# Patient Record
Sex: MALE | Race: WHITE | NOT HISPANIC OR LATINO | Employment: FULL TIME | URBAN - METROPOLITAN AREA
[De-identification: names, ages, dates, MRNs, and addresses within clinical notes are randomized per-mention and may not be internally consistent; named-entity substitution may affect disease eponyms.]

---

## 2020-07-13 ENCOUNTER — HOSPITAL ENCOUNTER (OUTPATIENT)
Dept: OTHER | Facility: HOSPITAL | Age: 50
Discharge: HOME | End: 2020-07-13
Attending: INTERNAL MEDICINE

## 2020-07-13 LAB
ALBUMIN SERPL-MCNC: 4.1 G/DL (ref 3.5–5)
ALBUMIN/GLOB SERPL: 1.3 {RATIO} (ref 1.4–2.6)
ALP SERPL-CCNC: 93 U/L (ref 53–128)
ALT SERPL-CCNC: 35 U/L (ref 10–40)
ANION GAP SERPL CALC-SCNC: 15 MMOL/L (ref 8–19)
AST SERPL-CCNC: 27 U/L (ref 15–50)
BASOPHILS # BLD AUTO: 0.12 10*3/UL (ref 0–0.2)
BASOPHILS NFR BLD AUTO: 0.9 % (ref 0–3)
BILIRUB SERPL-MCNC: 0.69 MG/DL (ref 0.2–1.3)
BUN SERPL-MCNC: 15 MG/DL (ref 5–25)
BUN/CREAT SERPL: 12 {RATIO} (ref 6–20)
CALCIUM SERPL-MCNC: 9.3 MG/DL (ref 8.7–10.4)
CHLORIDE SERPL-SCNC: 103 MMOL/L (ref 99–111)
CONV ABS IMM GRAN: 0.06 10*3/UL (ref 0–0.2)
CONV CO2: 24 MMOL/L (ref 22–32)
CONV IMMATURE GRAN: 0.5 % (ref 0–1.8)
CONV TOTAL PROTEIN: 7.3 G/DL (ref 6.3–8.2)
CREAT UR-MCNC: 1.26 MG/DL (ref 0.7–1.2)
DEPRECATED RDW RBC AUTO: 47.2 FL (ref 35.1–43.9)
EOSINOPHIL # BLD AUTO: 0.22 10*3/UL (ref 0–0.7)
EOSINOPHIL # BLD AUTO: 1.7 % (ref 0–7)
ERYTHROCYTE [DISTWIDTH] IN BLOOD BY AUTOMATED COUNT: 13.5 % (ref 11.6–14.4)
GFR SERPLBLD BASED ON 1.73 SQ M-ARVRAT: >60 ML/MIN/{1.73_M2}
GLOBULIN UR ELPH-MCNC: 3.2 G/DL (ref 2–3.5)
GLUCOSE SERPL-MCNC: 83 MG/DL (ref 70–99)
HCT VFR BLD AUTO: 47.9 % (ref 42–52)
HGB BLD-MCNC: 15.5 G/DL (ref 14–18)
LYMPHOCYTES # BLD AUTO: 3.74 10*3/UL (ref 1–5)
LYMPHOCYTES NFR BLD AUTO: 29.4 % (ref 20–45)
MCH RBC QN AUTO: 30.7 PG (ref 27–31)
MCHC RBC AUTO-ENTMCNC: 32.4 G/DL (ref 33–37)
MCV RBC AUTO: 94.9 FL (ref 80–96)
MONOCYTES # BLD AUTO: 1.22 10*3/UL (ref 0.2–1.2)
MONOCYTES NFR BLD AUTO: 9.6 % (ref 3–10)
NEUTROPHILS # BLD AUTO: 7.35 10*3/UL (ref 2–8)
NEUTROPHILS NFR BLD AUTO: 57.9 % (ref 30–85)
NRBC CBCN: 0 % (ref 0–0.7)
OSMOLALITY SERPL CALC.SUM OF ELEC: 284 MOSM/KG (ref 273–304)
PLATELET # BLD AUTO: 402 10*3/UL (ref 130–400)
PMV BLD AUTO: 10.7 FL (ref 9.4–12.4)
POTASSIUM SERPL-SCNC: 4.5 MMOL/L (ref 3.5–5.3)
RBC # BLD AUTO: 5.05 10*6/UL (ref 4.7–6.1)
SODIUM SERPL-SCNC: 137 MMOL/L (ref 135–147)
WBC # BLD AUTO: 12.71 10*3/UL (ref 4.8–10.8)

## 2020-07-16 ENCOUNTER — OFFICE VISIT CONVERTED (OUTPATIENT)
Dept: CARDIOLOGY | Facility: CLINIC | Age: 50
End: 2020-07-16
Attending: INTERNAL MEDICINE

## 2020-07-16 ENCOUNTER — CONVERSION ENCOUNTER (OUTPATIENT)
Dept: CARDIOLOGY | Facility: CLINIC | Age: 50
End: 2020-07-16

## 2020-07-17 ENCOUNTER — HOSPITAL ENCOUNTER (OUTPATIENT)
Dept: PREADMISSION TESTING | Facility: HOSPITAL | Age: 50
Discharge: HOME | End: 2020-07-17
Attending: INTERNAL MEDICINE

## 2020-07-19 LAB — SARS-COV-2 RNA SPEC QL NAA+PROBE: NOT DETECTED

## 2020-07-21 ENCOUNTER — HOSPITAL ENCOUNTER (OUTPATIENT)
Dept: INFUSION THERAPY | Facility: HOSPITAL | Age: 50
Setting detail: HOSPITAL OUTPATIENT SURGERY
Discharge: HOME | End: 2020-07-22
Attending: INTERNAL MEDICINE

## 2020-07-21 LAB
ALBUMIN SERPL-MCNC: 3.9 G/DL (ref 3.5–5)
ALBUMIN/GLOB SERPL: 1.1 {RATIO} (ref 1.4–2.6)
ALP SERPL-CCNC: 83 U/L (ref 53–128)
ALT SERPL-CCNC: 37 U/L (ref 10–40)
ANION GAP SERPL CALC-SCNC: 19 MMOL/L (ref 8–19)
AST SERPL-CCNC: 26 U/L (ref 15–50)
BASOPHILS # BLD AUTO: 0.09 10*3/UL (ref 0–0.2)
BASOPHILS NFR BLD AUTO: 0.7 % (ref 0–3)
BILIRUB SERPL-MCNC: 0.46 MG/DL (ref 0.2–1.3)
BNP SERPL-MCNC: 129 PG/ML (ref 0–900)
BUN SERPL-MCNC: 16 MG/DL (ref 5–25)
BUN/CREAT SERPL: 14 {RATIO} (ref 6–20)
CALCIUM SERPL-MCNC: 9.5 MG/DL (ref 8.7–10.4)
CHLORIDE SERPL-SCNC: 105 MMOL/L (ref 99–111)
CHOLEST SERPL-MCNC: 78 MG/DL (ref 107–200)
CHOLEST/HDLC SERPL: 3.7 {RATIO} (ref 3–6)
CONV ABS IMM GRAN: 0.06 10*3/UL (ref 0–0.2)
CONV CO2: 21 MMOL/L (ref 22–32)
CONV IMMATURE GRAN: 0.5 % (ref 0–1.8)
CONV TOTAL PROTEIN: 7.5 G/DL (ref 6.3–8.2)
CREAT UR-MCNC: 1.11 MG/DL (ref 0.7–1.2)
DEPRECATED RDW RBC AUTO: 45 FL (ref 35.1–43.9)
EOSINOPHIL # BLD AUTO: 0.3 10*3/UL (ref 0–0.7)
EOSINOPHIL # BLD AUTO: 2.4 % (ref 0–7)
ERYTHROCYTE [DISTWIDTH] IN BLOOD BY AUTOMATED COUNT: 13.1 % (ref 11.6–14.4)
GFR SERPLBLD BASED ON 1.73 SQ M-ARVRAT: >60 ML/MIN/{1.73_M2}
GLOBULIN UR ELPH-MCNC: 3.6 G/DL (ref 2–3.5)
GLUCOSE SERPL-MCNC: 108 MG/DL (ref 70–99)
HCT VFR BLD AUTO: 47.9 % (ref 42–52)
HDLC SERPL-MCNC: 21 MG/DL (ref 40–60)
HGB BLD-MCNC: 16 G/DL (ref 14–18)
INR PPP: 0.92 (ref 2–3)
LDLC SERPL CALC-MCNC: 37 MG/DL (ref 70–100)
LYMPHOCYTES # BLD AUTO: 2.29 10*3/UL (ref 1–5)
LYMPHOCYTES NFR BLD AUTO: 18.4 % (ref 20–45)
MCH RBC QN AUTO: 31.4 PG (ref 27–31)
MCHC RBC AUTO-ENTMCNC: 33.4 G/DL (ref 33–37)
MCV RBC AUTO: 93.9 FL (ref 80–96)
MONOCYTES # BLD AUTO: 1.01 10*3/UL (ref 0.2–1.2)
MONOCYTES NFR BLD AUTO: 8.1 % (ref 3–10)
NEUTROPHILS # BLD AUTO: 8.68 10*3/UL (ref 2–8)
NEUTROPHILS NFR BLD AUTO: 69.9 % (ref 30–85)
NRBC CBCN: 0 % (ref 0–0.7)
OSMOLALITY SERPL CALC.SUM OF ELEC: 292 MOSM/KG (ref 273–304)
PLATELET # BLD AUTO: 484 10*3/UL (ref 130–400)
PMV BLD AUTO: 10.8 FL (ref 9.4–12.4)
POTASSIUM SERPL-SCNC: 4.5 MMOL/L (ref 3.5–5.3)
PROTHROMBIN TIME: 10.1 S (ref 9.4–12)
RBC # BLD AUTO: 5.1 10*6/UL (ref 4.7–6.1)
SODIUM SERPL-SCNC: 140 MMOL/L (ref 135–147)
TRIGL SERPL-MCNC: 98 MG/DL (ref 40–150)
VLDLC SERPL-MCNC: 20 MG/DL (ref 5–37)
WBC # BLD AUTO: 12.43 10*3/UL (ref 4.8–10.8)

## 2020-08-05 ENCOUNTER — CONVERSION ENCOUNTER (OUTPATIENT)
Dept: CARDIOLOGY | Facility: CLINIC | Age: 50
End: 2020-08-05

## 2020-08-05 ENCOUNTER — OFFICE VISIT CONVERTED (OUTPATIENT)
Dept: CARDIOLOGY | Facility: CLINIC | Age: 50
End: 2020-08-05
Attending: INTERNAL MEDICINE

## 2020-08-19 ENCOUNTER — HOSPITAL ENCOUNTER (OUTPATIENT)
Dept: PREADMISSION TESTING | Facility: HOSPITAL | Age: 50
Discharge: HOME | End: 2020-08-19
Attending: INTERNAL MEDICINE

## 2020-08-20 LAB — SARS-COV-2 RNA SPEC QL NAA+PROBE: NOT DETECTED

## 2020-08-24 ENCOUNTER — HOSPITAL ENCOUNTER (OUTPATIENT)
Dept: INFUSION THERAPY | Facility: HOSPITAL | Age: 50
Setting detail: HOSPITAL OUTPATIENT SURGERY
Discharge: HOME | End: 2020-08-24
Attending: INTERNAL MEDICINE

## 2020-08-24 LAB
ANION GAP SERPL CALC-SCNC: 14 MMOL/L (ref 8–19)
BASOPHILS # BLD AUTO: 0.1 10*3/UL (ref 0–0.2)
BASOPHILS NFR BLD AUTO: 0.7 % (ref 0–3)
BUN SERPL-MCNC: 15 MG/DL (ref 5–25)
BUN/CREAT SERPL: 13 {RATIO} (ref 6–20)
CALCIUM SERPL-MCNC: 9.8 MG/DL (ref 8.7–10.4)
CHLORIDE SERPL-SCNC: 102 MMOL/L (ref 99–111)
CONV ABS IMM GRAN: 0.08 10*3/UL (ref 0–0.2)
CONV ACT HIGH RANGE CLOTTIME: NORMAL
CONV CO2: 22 MMOL/L (ref 22–32)
CONV IMMATURE GRAN: 0.6 % (ref 0–1.8)
CREAT UR-MCNC: 1.13 MG/DL (ref 0.7–1.2)
DEPRECATED RDW RBC AUTO: 44.8 FL (ref 35.1–43.9)
EOSINOPHIL # BLD AUTO: 0.21 10*3/UL (ref 0–0.7)
EOSINOPHIL # BLD AUTO: 1.5 % (ref 0–7)
ERYTHROCYTE [DISTWIDTH] IN BLOOD BY AUTOMATED COUNT: 13.1 % (ref 11.6–14.4)
GFR SERPLBLD BASED ON 1.73 SQ M-ARVRAT: >60 ML/MIN/{1.73_M2}
GLUCOSE SERPL-MCNC: 111 MG/DL (ref 70–99)
HCT VFR BLD AUTO: 43.4 % (ref 42–52)
HGB BLD-MCNC: 14.3 G/DL (ref 14–18)
LYMPHOCYTES # BLD AUTO: 2.23 10*3/UL (ref 1–5)
LYMPHOCYTES NFR BLD AUTO: 15.9 % (ref 20–45)
MCH RBC QN AUTO: 30.8 PG (ref 27–31)
MCHC RBC AUTO-ENTMCNC: 32.9 G/DL (ref 33–37)
MCV RBC AUTO: 93.3 FL (ref 80–96)
MONOCYTES # BLD AUTO: 1.16 10*3/UL (ref 0.2–1.2)
MONOCYTES NFR BLD AUTO: 8.3 % (ref 3–10)
NEUTROPHILS # BLD AUTO: 10.27 10*3/UL (ref 2–8)
NEUTROPHILS NFR BLD AUTO: 73 % (ref 30–85)
NRBC CBCN: 0 % (ref 0–0.7)
OSMOLALITY SERPL CALC.SUM OF ELEC: 280 MOSM/KG (ref 273–304)
PLATELET # BLD AUTO: 384 10*3/UL (ref 130–400)
PMV BLD AUTO: 11 FL (ref 9.4–12.4)
POTASSIUM SERPL-SCNC: 4.3 MMOL/L (ref 3.5–5.3)
RBC # BLD AUTO: 4.65 10*6/UL (ref 4.7–6.1)
SODIUM SERPL-SCNC: 134 MMOL/L (ref 135–147)
WBC # BLD AUTO: 14.05 10*3/UL (ref 4.8–10.8)

## 2020-09-01 LAB — CONV ACT HIGH RANGE CLOTTIME: NORMAL

## 2020-10-14 ENCOUNTER — OFFICE VISIT CONVERTED (OUTPATIENT)
Dept: CARDIOLOGY | Facility: CLINIC | Age: 50
End: 2020-10-14
Attending: NURSE PRACTITIONER

## 2021-05-13 NOTE — PROGRESS NOTES
Progress Note      Patient Name: Eric Curtis   Patient ID: [id number]   Sex: Male   YOB: 1970    Primary Care Provider: Stephan Morales Jr. MD   Referring Provider: Stephan Morales Jr. MD    Visit Date: October 14, 2020    Provider: ANGEL Ndiaye   Location: [facility]   Location Address: [address] [place] [address], [address]   [place], [place]  [id number]   Location Phone: [phone]          Chief Complaint  · Evaluate coronary artery disease.      History Of Present Illness  REFERRING PROVIDER: Stephan Morales Jr., MD   Eric Curtis is a 50 year old male who denies any chest pain or pressure. No palpitations, shortness of breath, swelling, dizziness, syncope, PND, or orthopnea. Cardiac-wise, he is feeling very well. He continues to not smoke. He admits he does not get any regular exercise, but he states he is very active in his job. He underwent a peripheral vascular surgery and had a PTA stent placement to the right common iliac artery on 08/24/2020, and he feels like his pain with walking has greatly improved.   PAST MEDICAL HISTORY: Hypertension; Peripheral vascular disease with stent to the right common iliac artery; ST-elevation myocardial infarction in July 2020.   PSYCHOSOCIAL HISTORY: Previously smoked, but quit. Moderate alcohol consumption.   CURRENT MEDICATIONS: Nitroglycerin 0.4 mg sublingual p.r.n.; Brilinta 90 mg b.i.d.; pantoprazole 40 mg daily; atorvastatin 80 mg daily; carvedilol 6.25 mg 1 in the morning and 2 at night; spironolactone 25 mg daily; losartan 50 mg daily; aspirin 81 mg daily; fluticasone nasal spray; Claritin daily.       Review of Systems  · Cardiovascular  o Denies  o : palpitations (fast, fluttering, or skipping beats), swelling (feet, ankles, hands), shortness of breath while walking or lying flat, chest pain or angina pectoris   · Respiratory  o Denies  o : chronic or frequent cough      Vitals  Date Time BP Position Site L\R Cuff Size HR RR TEMP  (F) WT  HT  BMI kg/m2 BSA m2 O2 Sat FR L/min FiO2 HC       10/14/2020 02:25 /100 Sitting    56 - R   200lbs 0oz 6'   27.12 2.15       10/14/2020 02:25 /98 Sitting    62 - R                   Physical Examination  · Constitutional  o Appearance  o : Awake, alert, in no acute distress.  · Eyes  o Conjunctivae  o : Conjunctivae normal.  · Ears, Nose, Mouth and Throat  o Oral Cavity  o :   § Oral Mucosa  § : Normal.  · Neck  o Jugular Veins  o : No JVD. Good carotid upstroke. No bruits noted.  · Respiratory  o Respiratory  o : Increased AP diameter with diminished breath sounds. Prolonged expiration. No rales or rhonchi. No dullness on percussion.   · Cardiovascular  o Heart  o : PMI not well felt. S1, S2 normal. No S3. No S4.   o Peripheral Vascular System  o :   § Extremities  § : Good femoral and pedal pulses. No pedal edema.  · Gastrointestinal  o Abdominal Examination  o : Soft. No tenderness or masses felt. No hepatosplenomegaly. Abdominal aorta is not palpable.  · Labs  o Labs  o : Lipids not done recently. Labs from August were reviewed.          Assessment     1.  Coronary artery disease with previous MI and stent/PCI without angina.  2.  Hypertension, uncertain control.  3.  Hyperlipidemia, unknown control.  4.  Nicotine dependence.  5.  Peripheral vascular disease with previous iliac stent.       Plan     1.  He will do a blood pressure log, and we will adjust hypertensive medications if needed.    2.  Check a lipid panel in the next few weeks, and adjust meds accordingly.  3.  Strongly encouraged him to never smoke again.  4.  Follow up in 6 months with labs or earlier if needed.          ANGEL Garza  JF:vm             Electronically Signed by: Humera Enrique-, Other -Author on October 15, 2020 11:43:51 AM  Electronically Co-signed by: ANGEL Ndiaye -Reviewer on October 15, 2020 12:12:43 PM

## 2021-05-13 NOTE — PROGRESS NOTES
Progress Note      Patient Name: Eric Curtis   Patient ID: [id number]   Sex: Male   YOB: 1970        Visit Date: August 5, 2020    Provider: Taye Crowley MD   Location: [facility]   Location Address: [address] [place] [address], [address]   [place]  [id number]   Location Phone: [phone]          Chief Complaint  · Chest pain       History Of Present Illness  REFERRING PROVIDER: Stephan Morales Jr., MD   Eric Curtis is a 50-year-old gentleman who is status post anterior ST elevation myocardial infarction and subsequently staged PCI of a diffusely diseased right coronary artery who had one bout of chest pain when he went on a fishing trip. It lasted 15 to 20 minutes. It was mild to moderate in severity after severe exertion. He had forgotten to take his nitroglycerin with him. He now travels everywhere with it. He has not had any recurrent pain since then. He denies palpitations, dizziness, or syncope.   PAST MEDICAL HISTORY: Hypertension; peripheral vascular disease; ST elevation myocardial infarction in July 2020.   FAMILY HISTORY: Positive for diabetes and hypertension. Negative for heart disease.   PSYCHOSOCIAL HISTORY: No history of mood change or depression. He drinks a moderate amount of alcohol. He previously smoked but quit.   CURRENT MEDICATIONS: include Losartan 50 mg daily; Pantoprazole 40 mg daily; Spironolactone 25 mg daily; nitroglycerin 0.4 mg p.r.n.; Carvedilol 6.25 mg b.i.d.; aspirin 81 mg daily; Atorvastatin 80 mg daily; Brilinta 90 mg b.i.d.; Fluticasone 50 mcg p.r.n. The dosage and frequency of the medications were reviewed with the patient.       Review of Systems  · Cardiovascular  o Admits  o : swelling (feet, ankles, hands), chest pain or angina pectoris   o Denies  o : palpitations (fast, fluttering, or skipping beats), shortness of breath while walking or lying flat  · Respiratory  o Denies  o : chronic or frequent cough, asthma or  wheezing      Vitals  Date Time BP Position Site L\R Cuff Size HR RR TEMP (F) WT  HT  BMI kg/m2 BSA m2 O2 Sat HC       08/05/2020 12:22 /87 Sitting    56 - R   198lbs 0oz 6'   26.85 2.14           Physical Examination  · Constitutional  o Appearance  o : Awake, alert, in no acute distress.  · Eyes  o Conjunctivae  o : Conjunctivae normal.  · Ears, Nose, Mouth and Throat  o Oral Cavity  o :   § Oral Mucosa  § : Normal.  · Neck  o Inspection/Palpation/Auscultation  o : No lymphadenopathy. No JVD. No bruit. Good carotid upstroke.  · Respiratory  o Respiratory  o : Increased AP diameter with diminished breath sounds. Prolonged expiration. No rales or rhonchi. No dullness on percussion.  · Cardiovascular  o Heart  o : PMI is not well felt. S1, S2 are normal. No S3. No S4.  o Peripheral Vascular System  o :   § Extremities  § : Good femoral and pedal pulses. No pedal edema.  · Gastrointestinal  o Abdominal Examination  o : Soft. No masses or tenderness felt. No hepatosplenomegaly. Abdominal aorta is not palpable.     The patient underwent a treadmill stress test which was negative for ischemia.           Assessment     1.  Coronary artery disease with one episode of angina, status post stent/PCI to the LAD and right coronary        artery.   2.  Hypertension, controlled.   3.  Hyperlipidemia.   4.  Nicotine dependence, cigarettes.   5.  Peripheral vascular disease with right severe iliac stenosis.       Plan     1.  Strongly urged him to completely stop smoking.  He thinks is going to do it in the next week or so.   2.  Increase Carvedilol to 12.5 mg b.i.d.  3.  Proceed with stenting to the right iliac artery by Dr. Austin in two to three weeks.   4.  Enroll in cardiac rehabilitation phase 2.  5.  I have asked him to do a two-week blood pressure log starting Sunday.  6.  Follow up with us in two to three months with blood work at that time.       Taye Crowley MD, [facility]  PM/pap    This note was transcribed by Alida  Bhakti.  pap/pm  The above service was transcribed by Alida Ya, and I attest to the accuracy of the note.  PM             Electronically Signed by: Adalgisa Ya-, Other -Author on August 6, 2020 12:34:41 PM  Electronically Co-signed by: Taye Crowley MD -Reviewer on August 6, 2020 02:05:54 PM

## 2021-05-13 NOTE — PROGRESS NOTES
Progress Note      Patient Name: Eric Curtis   Patient ID: [id number]   Sex: Male   YOB: 1970    Primary Care Provider: Stephan Morales Jr. MD   Referring Provider: Taye Crowley MD    Visit Date: July 16, 2020    Provider: Taye Crowley MD   Location: [facility]   Location Address: [address] [place] [address], [facility]   [place], [place]  [id number]   Location Phone: [phone]          Chief Complaint  · Fatigue   · Shortness of breath   · Tired feeling      History Of Present Illness  REFERRING PROVIDER: Stephan Morales Jr., MD   Eric Curtis is a 50-year-old gentleman who had an anterior ST elevation myocardial infarction, and since discharge he gets extremely fatigued and tired easily. He is short of breath with mild to moderate exertion. He denies dizziness or syncope.   PAST MEDICAL HISTORY: Positive for hypertension; peripheral vascular disease; ST elevation myocardial infarction July 2020.   FAMILY HISTORY: Positive for diabetes and hypertension. Negative for heart disease.   PSYCHOSOCIAL HISTORY: No history of mood changes or depression. He drinks a moderate amount of alcohol and smokes 1 pack of cigarettes per day.   CURRENT MEDICATIONS: include aspirin 81 mg daily; Atorvastatin 80 mg at bedtime; Carvedilol 6.25 mg b.i.d.; Losartan 50 mg at bedtime; NTG 0.4 mg p.r.n.; Spironolactone 25 mg daily; Ticagrelor 90 mg b.i.d.; Loratadine 10 mg daily; Fluticasone 2 sprays each nare qd; Protonix 40 mg daily. The dosage and frequency of the medications were reviewed with the patient.       Review of Systems  · Cardiovascular  o Denies  o : palpitations (fast, fluttering, or skipping beats), swelling (feet, ankles, hands), shortness of breath while walking or lying flat, chest pain or angina pectoris   · Respiratory  o Denies  o : chronic or frequent cough, asthma or wheezing      Vitals  Date Time BP Position Site L\R Cuff Size HR RR TEMP (F) WT  HT  BMI kg/m2 BSA m2 O2 Sat HC        07/16/2020 11:06 /86 Sitting    72 - R   198lbs 0oz 6'   26.85 2.14           Physical Examination  · Constitutional  o Appearance  o : Awake, alert, in no acute distress.  · Eyes  o Conjunctivae  o : Conjunctivae normal.  · Ears, Nose, Mouth and Throat  o Oral Cavity  o :   § Oral Mucosa  § : Normal.  · Neck  o Inspection/Palpation/Auscultation  o : No lymphadenopathy. No JVD. No bruit. Good carotid upstroke.  · Respiratory  o Respiratory  o : Increased AP diameter with diminished breath sounds. Prolonged expiration. No rales or rhonchi. No dullness on percussion.   · Cardiovascular  o Heart  o : PMI is not well felt. S1, S2 are normal. No S3. S4+.  o Peripheral Vascular System  o :   § Extremities  § : Good femoral and pedal pulses. No pedal edema.  · Gastrointestinal  o Abdominal Examination  o : Soft. No masses or tenderness felt. No hepatosplenomegaly. Abdominal aorta is not palpable.     EKG was performed in the office today.  Indication:       recent myocardial infarction, fatigue, shortness of breath, possible angina-equivalent.  Results:          sinus rhythm, non-specific T wave abnormalities anterolateral leads, minimal ST elevation                        lateral leads.  Comparison:   No EKG available for comparison.    Cardiac catheterization was reviewed.  He has residual disease that is high grade in his right coronary artery.  There is an 80-90% proximal and mid stenosis and a distal 60-70% stenosis in the right coronary artery.    CBC is normal, except for slightly elevated white count of 12.7.  Hemoglobin 15.5.  Platelets are normal.  Chemistry panel:  BUN 15, creatinine 1.26.           Assessment     1.  Angina pectoris.  2.  Coronary artery disease with recent ST elevation anterior wall myocardial infarction, s/p PCI.  3.  Hypertension.  4.  Nicotine dependence, cigarettes.  5.  Hyperlipidemia.       Plan     1.  PCI to the right coronary artery on Tuesday.  2.  Instructions on how to use  his NTG was given to the patient.  3.  Strongly urged him to stop smoking and offered aids, but he declined.  He said he is going to do it on his       own.  He does not look very motivated.  4.  We will change his Spironolactone to every other day and hold it on Tuesday.  5.  Indication and risk of the procedure were explained to him and his wife.    Taye Crowley M.D., [facility]  pmm/dmd           This note was transcribed by Isadora Begum.  dmd/rachael  The above service was transcribed by Isadora Begum, and I attest to the accuracy of the note.  PMM               Electronically Signed by: Isadora Begum-, -Author on July 20, 2020 08:13:38 AM  Electronically Co-signed by: Taye Crowley MD -Reviewer on July 29, 2020 04:13:54 PM

## 2021-05-14 VITALS
HEIGHT: 72 IN | DIASTOLIC BLOOD PRESSURE: 100 MMHG | BODY MASS INDEX: 27.09 KG/M2 | HEART RATE: 56 BPM | WEIGHT: 200 LBS | SYSTOLIC BLOOD PRESSURE: 146 MMHG

## 2021-05-15 VITALS
BODY MASS INDEX: 26.82 KG/M2 | WEIGHT: 198 LBS | DIASTOLIC BLOOD PRESSURE: 87 MMHG | HEART RATE: 56 BPM | HEIGHT: 72 IN | SYSTOLIC BLOOD PRESSURE: 128 MMHG

## 2021-05-15 VITALS
WEIGHT: 198 LBS | DIASTOLIC BLOOD PRESSURE: 86 MMHG | SYSTOLIC BLOOD PRESSURE: 124 MMHG | BODY MASS INDEX: 26.82 KG/M2 | HEIGHT: 72 IN | HEART RATE: 72 BPM

## 2021-08-24 RX ORDER — TICAGRELOR 90 MG/1
TABLET ORAL
Qty: 60 TABLET | OUTPATIENT
Start: 2021-08-24

## 2021-11-04 ENCOUNTER — TELEPHONE (OUTPATIENT)
Dept: CARDIOLOGY | Facility: CLINIC | Age: 51
End: 2021-11-04

## 2021-11-16 ENCOUNTER — TELEPHONE (OUTPATIENT)
Dept: CARDIOLOGY | Facility: CLINIC | Age: 51
End: 2021-11-16

## 2021-11-16 NOTE — TELEPHONE ENCOUNTER
Patient stated that he has been without the medication for 1 month because it is too expensive. He is still taking the Aspirin. Please advise.

## 2021-11-17 DIAGNOSIS — I25.10 CORONARY ARTERY DISEASE INVOLVING NATIVE HEART WITHOUT ANGINA PECTORIS, UNSPECIFIED VESSEL OR LESION TYPE: Primary | ICD-10-CM

## 2021-11-17 RX ORDER — CLOPIDOGREL BISULFATE 75 MG/1
75 TABLET ORAL DAILY
Qty: 90 TABLET | Refills: 3 | Status: SHIPPED | OUTPATIENT
Start: 2021-11-17 | End: 2021-12-01 | Stop reason: SDUPTHER

## 2021-11-17 NOTE — TELEPHONE ENCOUNTER
I am assuming that the medication talks about being expensive is Brilinta.  We will change it to clopidogrel 75 mg a day.  Continue aspirin 81 mg a day

## 2021-11-23 RX ORDER — ATORVASTATIN CALCIUM 80 MG/1
TABLET, FILM COATED ORAL
Qty: 10 TABLET | Refills: 0 | Status: SHIPPED | OUTPATIENT
Start: 2021-11-23 | End: 2021-12-01 | Stop reason: SDUPTHER

## 2021-11-24 RX ORDER — LOSARTAN POTASSIUM 50 MG/1
TABLET ORAL
Qty: 7 TABLET | Refills: 0 | Status: SHIPPED | OUTPATIENT
Start: 2021-11-24 | End: 2021-12-01 | Stop reason: SDUPTHER

## 2021-12-01 ENCOUNTER — OFFICE VISIT (OUTPATIENT)
Dept: CARDIOLOGY | Facility: CLINIC | Age: 51
End: 2021-12-01

## 2021-12-01 VITALS
WEIGHT: 211 LBS | HEART RATE: 64 BPM | DIASTOLIC BLOOD PRESSURE: 98 MMHG | HEIGHT: 72 IN | SYSTOLIC BLOOD PRESSURE: 140 MMHG | BODY MASS INDEX: 28.58 KG/M2

## 2021-12-01 DIAGNOSIS — I10 ESSENTIAL HYPERTENSION: ICD-10-CM

## 2021-12-01 DIAGNOSIS — E78.5 HYPERLIPIDEMIA LDL GOAL <70: ICD-10-CM

## 2021-12-01 DIAGNOSIS — I73.9 PVD (PERIPHERAL VASCULAR DISEASE) WITH CLAUDICATION (HCC): Primary | ICD-10-CM

## 2021-12-01 DIAGNOSIS — I25.10 CORONARY ARTERY DISEASE INVOLVING NATIVE HEART WITHOUT ANGINA PECTORIS, UNSPECIFIED VESSEL OR LESION TYPE: ICD-10-CM

## 2021-12-01 PROCEDURE — 99214 OFFICE O/P EST MOD 30 MIN: CPT | Performed by: INTERNAL MEDICINE

## 2021-12-01 RX ORDER — SPIRONOLACTONE 25 MG/1
25 TABLET ORAL DAILY
Qty: 90 TABLET | Refills: 3 | Status: SHIPPED | OUTPATIENT
Start: 2021-12-01 | End: 2022-12-28 | Stop reason: SDUPTHER

## 2021-12-01 RX ORDER — CARVEDILOL 6.25 MG/1
TABLET ORAL
Qty: 270 TABLET | Refills: 3 | Status: SHIPPED | OUTPATIENT
Start: 2021-12-01 | End: 2022-12-28 | Stop reason: SDUPTHER

## 2021-12-01 RX ORDER — LOSARTAN POTASSIUM 50 MG/1
50 TABLET ORAL 2 TIMES DAILY
Qty: 180 TABLET | Refills: 3 | Status: SHIPPED | OUTPATIENT
Start: 2021-12-01 | End: 2022-12-28 | Stop reason: SDUPTHER

## 2021-12-01 RX ORDER — CLOPIDOGREL BISULFATE 75 MG/1
75 TABLET ORAL DAILY
Qty: 90 TABLET | Refills: 3 | Status: SHIPPED | OUTPATIENT
Start: 2021-12-01 | End: 2022-12-28 | Stop reason: SDUPTHER

## 2021-12-01 RX ORDER — CARVEDILOL 6.25 MG/1
6.25 TABLET ORAL 2 TIMES DAILY WITH MEALS
COMMUNITY
End: 2021-12-01 | Stop reason: SDUPTHER

## 2021-12-01 RX ORDER — SPIRONOLACTONE 25 MG/1
25 TABLET ORAL DAILY
COMMUNITY
End: 2021-12-01 | Stop reason: SDUPTHER

## 2021-12-01 RX ORDER — NITROGLYCERIN 0.4 MG/1
0.4 TABLET SUBLINGUAL
COMMUNITY
End: 2022-12-28 | Stop reason: SDUPTHER

## 2021-12-01 RX ORDER — ASPIRIN 81 MG/1
81 TABLET ORAL DAILY
COMMUNITY

## 2021-12-01 RX ORDER — ATORVASTATIN CALCIUM 80 MG/1
80 TABLET, FILM COATED ORAL NIGHTLY
Qty: 90 TABLET | Refills: 3 | Status: SHIPPED | OUTPATIENT
Start: 2021-12-01 | End: 2022-12-28 | Stop reason: SDUPTHER

## 2021-12-01 NOTE — ASSESSMENT & PLAN NOTE
He has had recurrence of his right leg claudication.  His right femoral pulses weak although his pedal pulses good.  I am going to go ahead and do AELE  with stress and then decide if he is going to need further intervention

## 2021-12-01 NOTE — ASSESSMENT & PLAN NOTE
Hypertension is not as well-controlled as I would like to be.  He frequently has systolic pressures between 125 and 140.  I am going to increase his losartan to 50 twice daily and continue the spironolactone 25 mg once a day.  He is going to get his blood work done including CBC chemistry panel lipid panel thyroid panel in 5 days

## 2021-12-01 NOTE — PROGRESS NOTES
Office Visit    Chief Complaint  Hypertension    Subjective            Eric Ever presents to [facility]  Mr. Curtis is a 51 years old gentleman with coronary disease previous anterior wall marker infarction, ST elevation, subsequent PCI to LAD, right coronary artery as well as stent to his right iliac artery  (Dr. Austin did the peripheral intervention), who is done reasonably well.  From a cardiac standpoint he has not had any chest pain shortness of breath palpitations dizziness syncope.  However, over the last several months is a recurrence of his right leg pain with activities.  He is not able to do his full range of exercise but he still tries to walk for 5 days a week.  He has quit smoking his last cigarette was in June 2021      Past Medical History:   Diagnosis Date   • Hypertension    • Myocardial infarction (HCC)        No Known Allergies     Past Surgical History:   Procedure Laterality Date   • CARDIAC CATHETERIZATION     • CORONARY STENT PLACEMENT          Social History     Tobacco Use   • Smoking status: Former Smoker   • Smokeless tobacco: Never Used   Vaping Use   • Vaping Use: Never used   Substance Use Topics   • Alcohol use: Yes   • Drug use: Never       History reviewed. No pertinent family history.     Prior to Admission medications    Medication Sig Start Date End Date Taking? Authorizing Provider   aspirin 81 MG EC tablet Take 81 mg by mouth Daily.   Yes ProviderSaw MD   atorvastatin (LIPITOR) 80 MG tablet TAKE 1 TABLET(80 MG) BY MOUTH EVERY DAY 11/23/21  Yes Taye Crowley MD   carvedilol (COREG) 6.25 MG tablet Take 6.25 mg by mouth 2 (Two) Times a Day With Meals. Take 1 tablet by mouth every morning and 2 tablets by mouth every evening   Yes ProviderSaw MD   clopidogrel (PLAVIX) 75 MG tablet Take 1 tablet by mouth Daily. 11/17/21  Yes Sujatha Lawrence APRN   losartan (COZAAR) 50 MG tablet TAKE 1 TABLET(50 MG) BY MOUTH EVERY DAY 11/24/21   "Yes Taye Crowley MD   nitroglycerin (NITROSTAT) 0.4 MG SL tablet Place 0.4 mg under the tongue Every 5 (Five) Minutes As Needed for Chest Pain. Take no more than 3 doses in 15 minutes.   Yes ProviderSaw MD   spironolactone (ALDACTONE) 25 MG tablet Take 25 mg by mouth Daily.   Yes ProviderSaw MD        Review of Systems   Constitutional: Negative for fatigue.   Respiratory: Negative for cough and shortness of breath.    Cardiovascular: Negative for chest pain, palpitations and leg swelling.   Neurological: Negative for dizziness.        Objective     /98 (BP Location: Left arm, Patient Position: Standing, Cuff Size: Adult)   Pulse 64   Ht 182.9 cm (72\")   Wt 95.7 kg (211 lb)   BMI 28.62 kg/m²       Physical Exam  Constitutional:       General: He is awake.      Appearance: Normal appearance.   Neck:      Thyroid: No thyromegaly.      Vascular: No carotid bruit or JVD.   Cardiovascular:      Rate and Rhythm: Normal rate and regular rhythm.      Chest Wall: PMI is not displaced.      Pulses:           Femoral pulses are 1+ on the right side and 2+ on the left side.       Dorsalis pedis pulses are 1+ on the right side and 2+ on the left side.        Posterior tibial pulses are 2+ on the right side and 2+ on the left side.      Heart sounds: Normal heart sounds, S1 normal and S2 normal. No murmur heard.  No friction rub. No gallop. No S3 or S4 sounds.    Pulmonary:      Effort: Pulmonary effort is normal.      Breath sounds: Normal breath sounds and air entry. No wheezing, rhonchi or rales.   Abdominal:      General: Bowel sounds are normal.      Palpations: Abdomen is soft. There is no mass.      Tenderness: There is no abdominal tenderness.   Musculoskeletal:      Cervical back: Neck supple.      Right lower leg: No edema.      Left lower leg: No edema.   Neurological:      Mental Status: He is alert and oriented to person, place, and time.   Psychiatric:         Mood and Affect: Mood " normal.         Behavior: Behavior is cooperative.       Lab Results   Component Value Date     07/21/2020     07/06/2020             Lab Results   Component Value Date    TSH 3.500 07/06/2020      Lab Results   Component Value Date    FREET4 0.9 07/06/2020      No results found for: DDIMERQUANT  Magnesium   Date Value Ref Range Status   07/06/2020 1.88 1.60 - 2.30 mg/dL Final      No results found for: DIGOXIN              Result Review :                           Assessment and Plan        Diagnoses and all orders for this visit:    1. PVD (peripheral vascular disease) with claudication (HCC) (Primary)  Assessment & Plan:  He has had recurrence of his right leg claudication.  His right femoral pulses weak although his pedal pulses good.  I am going to go ahead and do AELE  with stress and then decide if he is going to need further intervention    Orders:  -     Lipid Panel; Future  -     CBC & Differential; Future  -     Comprehensive Metabolic Panel; Future  -     T4, Free; Future  -     TSH; Future  -     Lipid Panel; Future  -     Comprehensive Metabolic Panel; Future  -     Magnesium; Future  -     Doppler Arterial Lower Extremity Stress CAR; Future    2. Coronary artery disease involving native heart without angina pectoris, unspecified vessel or lesion type  Assessment & Plan:  Coronary artery disease is stable.  He has not had any anginal spells since his second PCI.    Orders:  -     clopidogrel (PLAVIX) 75 MG tablet; Take 1 tablet by mouth Daily.  Dispense: 90 tablet; Refill: 3  -     Lipid Panel; Future  -     CBC & Differential; Future  -     Comprehensive Metabolic Panel; Future  -     T4, Free; Future  -     TSH; Future  -     Lipid Panel; Future  -     Comprehensive Metabolic Panel; Future  -     Magnesium; Future  -     Doppler Arterial Lower Extremity Stress CAR; Future    3. Hyperlipidemia LDL goal <70  Assessment & Plan:  Lipid abnormalities are uncertain.  He is going to get his  blood work done in the next few days    Orders:  -     Lipid Panel; Future  -     CBC & Differential; Future  -     Comprehensive Metabolic Panel; Future  -     T4, Free; Future  -     TSH; Future  -     Lipid Panel; Future  -     Comprehensive Metabolic Panel; Future  -     Magnesium; Future  -     Doppler Arterial Lower Extremity Stress CAR; Future    4. Essential hypertension  Assessment & Plan:  Hypertension is not as well-controlled as I would like to be.  He frequently has systolic pressures between 125 and 140.  I am going to increase his losartan to 50 twice daily and continue the spironolactone 25 mg once a day.  He is going to get his blood work done including CBC chemistry panel lipid panel thyroid panel in 5 days    Orders:  -     Lipid Panel; Future  -     CBC & Differential; Future  -     Comprehensive Metabolic Panel; Future  -     T4, Free; Future  -     TSH; Future  -     Lipid Panel; Future  -     Comprehensive Metabolic Panel; Future  -     Magnesium; Future  -     Doppler Arterial Lower Extremity Stress CAR; Future    Other orders  -     atorvastatin (LIPITOR) 80 MG tablet; Take 1 tablet by mouth Every Night.  Dispense: 90 tablet; Refill: 3  -     spironolactone (ALDACTONE) 25 MG tablet; Take 1 tablet by mouth Daily.  Dispense: 90 tablet; Refill: 3  -     losartan (COZAAR) 50 MG tablet; Take 1 tablet by mouth 2 (Two) Times a Day.  Dispense: 180 tablet; Refill: 3  -     carvedilol (COREG) 6.25 MG tablet; Take 1 tablet by mouth every morning and 2 tablets by mouth every evening  Dispense: 270 tablet; Refill: 3          Follow Up     Return in about 6 months (around 6/1/2022) for AELE WITH STRESS ASAP.    Patient was given instructions and counseling regarding his condition or for health maintenance advice. Please see specific information pulled into the AVS if appropriate.     Taye Crowley MD  12/01/21 13:16 EST

## 2021-12-04 ENCOUNTER — LAB (OUTPATIENT)
Dept: LAB | Facility: HOSPITAL | Age: 51
End: 2021-12-04

## 2021-12-04 ENCOUNTER — TRANSCRIBE ORDERS (OUTPATIENT)
Dept: ADMINISTRATIVE | Facility: HOSPITAL | Age: 51
End: 2021-12-04

## 2021-12-04 DIAGNOSIS — E78.5 HYPERLIPIDEMIA LDL GOAL <70: ICD-10-CM

## 2021-12-04 DIAGNOSIS — I10 ESSENTIAL HYPERTENSION: ICD-10-CM

## 2021-12-04 DIAGNOSIS — I73.9 PVD (PERIPHERAL VASCULAR DISEASE) WITH CLAUDICATION (HCC): ICD-10-CM

## 2021-12-04 DIAGNOSIS — I25.10 CORONARY ARTERY DISEASE INVOLVING NATIVE HEART WITHOUT ANGINA PECTORIS, UNSPECIFIED VESSEL OR LESION TYPE: ICD-10-CM

## 2021-12-04 LAB
ALBUMIN SERPL-MCNC: 4.5 G/DL (ref 3.5–5.2)
ALBUMIN/GLOB SERPL: 1.7 G/DL
ALP SERPL-CCNC: 86 U/L (ref 39–117)
ALT SERPL W P-5'-P-CCNC: 43 U/L (ref 1–41)
ANION GAP SERPL CALCULATED.3IONS-SCNC: 10.2 MMOL/L (ref 5–15)
AST SERPL-CCNC: 28 U/L (ref 1–40)
BASOPHILS # BLD AUTO: 0.06 10*3/MM3 (ref 0–0.2)
BASOPHILS NFR BLD AUTO: 0.6 % (ref 0–1.5)
BILIRUB SERPL-MCNC: 0.7 MG/DL (ref 0–1.2)
BUN SERPL-MCNC: 14 MG/DL (ref 6–20)
BUN/CREAT SERPL: 10.9 (ref 7–25)
CALCIUM SPEC-SCNC: 9.6 MG/DL (ref 8.6–10.5)
CHLORIDE SERPL-SCNC: 105 MMOL/L (ref 98–107)
CHOLEST SERPL-MCNC: 116 MG/DL (ref 0–200)
CO2 SERPL-SCNC: 22.8 MMOL/L (ref 22–29)
CREAT SERPL-MCNC: 1.29 MG/DL (ref 0.76–1.27)
DEPRECATED RDW RBC AUTO: 43.6 FL (ref 37–54)
EOSINOPHIL # BLD AUTO: 0.21 10*3/MM3 (ref 0–0.4)
EOSINOPHIL NFR BLD AUTO: 2 % (ref 0.3–6.2)
ERYTHROCYTE [DISTWIDTH] IN BLOOD BY AUTOMATED COUNT: 12.6 % (ref 12.3–15.4)
GFR SERPL CREATININE-BSD FRML MDRD: 59 ML/MIN/1.73
GLOBULIN UR ELPH-MCNC: 2.7 GM/DL
GLUCOSE SERPL-MCNC: 113 MG/DL (ref 65–99)
HCT VFR BLD AUTO: 48.1 % (ref 37.5–51)
HDLC SERPL-MCNC: 26 MG/DL (ref 40–60)
HGB BLD-MCNC: 16.5 G/DL (ref 13–17.7)
IMM GRANULOCYTES # BLD AUTO: 0.03 10*3/MM3 (ref 0–0.05)
IMM GRANULOCYTES NFR BLD AUTO: 0.3 % (ref 0–0.5)
LDLC SERPL CALC-MCNC: 64 MG/DL (ref 0–100)
LDLC/HDLC SERPL: 2.35 {RATIO}
LYMPHOCYTES # BLD AUTO: 2.39 10*3/MM3 (ref 0.7–3.1)
LYMPHOCYTES NFR BLD AUTO: 23.3 % (ref 19.6–45.3)
MCH RBC QN AUTO: 32.2 PG (ref 26.6–33)
MCHC RBC AUTO-ENTMCNC: 34.3 G/DL (ref 31.5–35.7)
MCV RBC AUTO: 93.8 FL (ref 79–97)
MONOCYTES # BLD AUTO: 0.71 10*3/MM3 (ref 0.1–0.9)
MONOCYTES NFR BLD AUTO: 6.9 % (ref 5–12)
NEUTROPHILS NFR BLD AUTO: 6.85 10*3/MM3 (ref 1.7–7)
NEUTROPHILS NFR BLD AUTO: 66.9 % (ref 42.7–76)
NRBC BLD AUTO-RTO: 0 /100 WBC (ref 0–0.2)
PLATELET # BLD AUTO: 347 10*3/MM3 (ref 140–450)
PMV BLD AUTO: 11.5 FL (ref 6–12)
POTASSIUM SERPL-SCNC: 4.4 MMOL/L (ref 3.5–5.2)
PROT SERPL-MCNC: 7.2 G/DL (ref 6–8.5)
RBC # BLD AUTO: 5.13 10*6/MM3 (ref 4.14–5.8)
SODIUM SERPL-SCNC: 138 MMOL/L (ref 136–145)
T4 FREE SERPL-MCNC: 1.26 NG/DL (ref 0.93–1.7)
TRIGL SERPL-MCNC: 145 MG/DL (ref 0–150)
TSH SERPL DL<=0.05 MIU/L-ACNC: 2.84 UIU/ML (ref 0.27–4.2)
VLDLC SERPL-MCNC: 26 MG/DL (ref 5–40)
WBC NRBC COR # BLD: 10.25 10*3/MM3 (ref 3.4–10.8)

## 2021-12-04 PROCEDURE — 84443 ASSAY THYROID STIM HORMONE: CPT

## 2021-12-04 PROCEDURE — 84439 ASSAY OF FREE THYROXINE: CPT

## 2021-12-04 PROCEDURE — 36415 COLL VENOUS BLD VENIPUNCTURE: CPT

## 2021-12-04 PROCEDURE — 85025 COMPLETE CBC W/AUTO DIFF WBC: CPT

## 2021-12-04 PROCEDURE — 80061 LIPID PANEL: CPT

## 2021-12-04 PROCEDURE — 80053 COMPREHEN METABOLIC PANEL: CPT

## 2021-12-07 DIAGNOSIS — I10 ESSENTIAL HYPERTENSION: Primary | ICD-10-CM

## 2021-12-09 ENCOUNTER — TELEPHONE (OUTPATIENT)
Dept: CARDIOLOGY | Facility: CLINIC | Age: 51
End: 2021-12-09

## 2021-12-09 NOTE — TELEPHONE ENCOUNTER
----- Message from ANGEL De Oliveira sent at 12/7/2021  4:56 PM EST -----  LDL Cholesterol is at goal. HDL chronically low. Continue current cholesterol medications. Kidney Function abnormal but unchanged. One of the liver enzymes is slightly elevated. Repeat CMP in one month

## 2021-12-20 ENCOUNTER — HOSPITAL ENCOUNTER (OUTPATIENT)
Dept: CARDIOLOGY | Facility: HOSPITAL | Age: 51
Discharge: HOME | End: 2021-12-20
Admitting: INTERNAL MEDICINE

## 2021-12-20 DIAGNOSIS — E78.5 HYPERLIPIDEMIA LDL GOAL <70: ICD-10-CM

## 2021-12-20 DIAGNOSIS — I10 ESSENTIAL HYPERTENSION: ICD-10-CM

## 2021-12-20 DIAGNOSIS — I73.9 PVD (PERIPHERAL VASCULAR DISEASE) WITH CLAUDICATION (HCC): ICD-10-CM

## 2021-12-20 DIAGNOSIS — I25.10 CORONARY ARTERY DISEASE INVOLVING NATIVE HEART WITHOUT ANGINA PECTORIS, UNSPECIFIED VESSEL OR LESION TYPE: ICD-10-CM

## 2021-12-20 LAB
BH CV LOWER ARTERIAL LEFT ABI RATIO: 0.9
BH CV LOWER ARTERIAL LEFT DORSALIS PEDIS SYS MAX: 116 MMHG
BH CV LOWER ARTERIAL LEFT GREAT TOE SYS MAX: 117 MMHG
BH CV LOWER ARTERIAL LEFT LOW THIGH SYS MAX: 124 MMHG
BH CV LOWER ARTERIAL LEFT POPLITEAL SYS MAX: 126 MMHG
BH CV LOWER ARTERIAL LEFT POST TIBIAL SYS MAX: 123 MMHG
BH CV LOWER ARTERIAL LEFT TBI RATIO: 0.85
BH CV LOWER ARTERIAL RIGHT ABI RATIO: 0.96
BH CV LOWER ARTERIAL RIGHT DORSALIS PEDIS SYS MAX: 119 MMHG
BH CV LOWER ARTERIAL RIGHT GREAT TOE SYS MAX: 95 MMHG
BH CV LOWER ARTERIAL RIGHT LOW THIGH SYS MAX: 117 MMHG
BH CV LOWER ARTERIAL RIGHT POPLITEAL SYS MAX: 125 MMHG
BH CV LOWER ARTERIAL RIGHT POST TIBIAL SYS MAX: 132 MMHG
BH CV LOWER ARTERIAL RIGHT TBI RATIO: 0.69
MAXIMAL PREDICTED HEART RATE: 169 BPM
STRESS TARGET HR: 144 BPM
UPPER ARTERIAL LEFT ARM BRACHIAL SYS MAX: 129 MMHG
UPPER ARTERIAL RIGHT ARM BRACHIAL SYS MAX: 137 MMHG

## 2021-12-20 PROCEDURE — 93924 LWR XTR VASC STDY BILAT: CPT

## 2021-12-20 PROCEDURE — 93924 LWR XTR VASC STDY BILAT: CPT | Performed by: SURGERY

## 2022-01-06 ENCOUNTER — TELEPHONE (OUTPATIENT)
Dept: CARDIOLOGY | Facility: CLINIC | Age: 52
End: 2022-01-06

## 2022-07-06 ENCOUNTER — OFFICE VISIT (OUTPATIENT)
Dept: CARDIOLOGY | Facility: CLINIC | Age: 52
End: 2022-07-06

## 2022-07-06 VITALS
WEIGHT: 204.2 LBS | HEIGHT: 72 IN | HEART RATE: 64 BPM | DIASTOLIC BLOOD PRESSURE: 72 MMHG | BODY MASS INDEX: 27.66 KG/M2 | SYSTOLIC BLOOD PRESSURE: 112 MMHG

## 2022-07-06 DIAGNOSIS — I73.9 PVD (PERIPHERAL VASCULAR DISEASE) WITH CLAUDICATION: ICD-10-CM

## 2022-07-06 DIAGNOSIS — I25.10 CORONARY ARTERY DISEASE INVOLVING NATIVE CORONARY ARTERY OF NATIVE HEART WITHOUT ANGINA PECTORIS: Primary | ICD-10-CM

## 2022-07-06 DIAGNOSIS — F17.210 CIGARETTE NICOTINE DEPENDENCE WITHOUT COMPLICATION: ICD-10-CM

## 2022-07-06 DIAGNOSIS — E78.5 HYPERLIPIDEMIA LDL GOAL <70: ICD-10-CM

## 2022-07-06 DIAGNOSIS — I10 ESSENTIAL HYPERTENSION: ICD-10-CM

## 2022-07-06 PROCEDURE — 99214 OFFICE O/P EST MOD 30 MIN: CPT | Performed by: INTERNAL MEDICINE

## 2022-07-06 RX ORDER — NICOTINE 21 MG/24HR
1 PATCH, TRANSDERMAL 24 HOURS TRANSDERMAL EVERY 24 HOURS
Qty: 30 EACH | Refills: 0 | Status: SHIPPED | OUTPATIENT
Start: 2022-07-06

## 2022-07-06 RX ORDER — PANTOPRAZOLE SODIUM 40 MG/1
40 TABLET, DELAYED RELEASE ORAL DAILY
COMMUNITY

## 2022-07-06 NOTE — ASSESSMENT & PLAN NOTE
He has started smoking again and I have discussed with him the urgent need for him to stop smoking in light of his coronary disease and peripheral vascular disease.  He and his wife understand the long-term consequences of continued smoking as little as 2 cigarettes a day.  Counseling was performed for 4 to 6 minutes.  He is agreed to try to use nicotine patches and stop smoking in the next 1 month.

## 2022-07-06 NOTE — PROGRESS NOTES
Office Visit    Chief Complaint  Peripheral Vascular Disease    Subjective            Eric Curtis presents to [facility] CARDIOLOGY  Eric is a 52 years old young male with multivessel coronary disease, s/p PCI of LAD and right coronary arteries who is doing well from a coronary standpoint.  He denies chest pain palpitation shortness of breath dizziness syncope.  His major complaint is leg pain with activities.  With moderate to severe activity he gets shooting pain down his thigh that makes him stop.  This has been going on for the last 3 months.  He has resumed his smoking which he had stopped for 1 year      Past Medical History:   Diagnosis Date   • Hypertension    • Myocardial infarction (HCC)        Allergies   Allergen Reactions   • Hydrocodone Rash        Past Surgical History:   Procedure Laterality Date   • CARDIAC CATHETERIZATION     • CORONARY STENT PLACEMENT          Social History     Tobacco Use   • Smoking status: Current Every Day Smoker     Packs/day: 0.50   • Smokeless tobacco: Never Used   Vaping Use   • Vaping Use: Never used   Substance Use Topics   • Alcohol use: Yes     Comment: occ   • Drug use: Never       History reviewed. No pertinent family history.     Prior to Admission medications    Medication Sig Start Date End Date Taking? Authorizing Provider   aspirin 81 MG EC tablet Take 81 mg by mouth Daily.   Yes ProviderSaw MD   atorvastatin (LIPITOR) 80 MG tablet Take 1 tablet by mouth Every Night. 12/1/21  Yes Taye Crowley MD   carvedilol (COREG) 6.25 MG tablet Take 1 tablet by mouth every morning and 2 tablets by mouth every evening 12/1/21  Yes Taye Crowley MD   clopidogrel (PLAVIX) 75 MG tablet Take 1 tablet by mouth Daily. 12/1/21  Yes Taye Crowley MD   losartan (COZAAR) 50 MG tablet Take 1 tablet by mouth 2 (Two) Times a Day. 12/1/21  Yes Taye Crowley MD   nitroglycerin (NITROSTAT) 0.4 MG SL tablet Place 0.4 mg under the tongue Every 5 (Five)  "Minutes As Needed for Chest Pain. Take no more than 3 doses in 15 minutes.   Yes Provider, MD Saw   pantoprazole (PROTONIX) 40 MG EC tablet Take 40 mg by mouth Daily.   Yes Provider, MD Saw   spironolactone (ALDACTONE) 25 MG tablet Take 1 tablet by mouth Daily. 12/1/21  Yes Taye Crowley MD        Review of Systems   Constitutional: Negative for fatigue.   Respiratory: Negative for cough and shortness of breath.    Cardiovascular: Negative for chest pain, palpitations and leg swelling.   Neurological: Negative for dizziness.        Objective     /72   Pulse 64   Ht 182.9 cm (72\")   Wt 92.6 kg (204 lb 3.2 oz)   BMI 27.69 kg/m²       Physical Exam  Constitutional:       General: He is awake.      Appearance: Normal appearance.   Neck:      Thyroid: No thyromegaly.      Vascular: No carotid bruit or JVD.   Cardiovascular:      Rate and Rhythm: Normal rate and regular rhythm.      Chest Wall: PMI is not displaced.      Pulses:           Femoral pulses are 1+ on the right side and 2+ on the left side.       Dorsalis pedis pulses are 0 on the right side and 2+ on the left side.        Posterior tibial pulses are 0 on the right side and 2+ on the left side.      Heart sounds: Normal heart sounds, S1 normal and S2 normal. No murmur heard.    No friction rub. No gallop. No S3 or S4 sounds.   Pulmonary:      Effort: Pulmonary effort is normal.      Breath sounds: Normal breath sounds and air entry. No wheezing, rhonchi or rales.   Abdominal:      General: Bowel sounds are normal.      Palpations: Abdomen is soft. There is no mass.      Tenderness: There is no abdominal tenderness.   Musculoskeletal:      Cervical back: Neck supple.      Right lower leg: No edema.      Left lower leg: No edema.   Neurological:      Mental Status: He is alert and oriented to person, place, and time.   Psychiatric:         Mood and Affect: Mood normal.         Behavior: Behavior is cooperative.       Lab Results "   Component Value Date     07/21/2020     07/06/2020     CMP    CMP 12/4/21   Glucose 113 (A)   BUN 14   Creatinine 1.29 (A)   eGFR Non African Am 59 (A)   Sodium 138   Potassium 4.4   Chloride 105   Calcium 9.6   Albumin 4.50   Total Bilirubin 0.7   Alkaline Phosphatase 86   AST (SGOT) 28   ALT (SGPT) 43 (A)   (A) Abnormal value            CBC w/diff    CBC w/Diff 12/4/21   WBC 10.25   RBC 5.13   Hemoglobin 16.5   Hematocrit 48.1   MCV 93.8   MCH 32.2   MCHC 34.3   RDW 12.6   Platelets 347   Neutrophil Rel % 66.9   Immature Granulocyte Rel % 0.3   Lymphocyte Rel % 23.3   Monocyte Rel % 6.9   Eosinophil Rel % 2.0   Basophil Rel % 0.6            Lipid Panel    Lipid Panel 12/4/21   Total Cholesterol 116   Triglycerides 145   HDL Cholesterol 26 (A)   VLDL Cholesterol 26   LDL Cholesterol  64   LDL/HDL Ratio 2.35   (A) Abnormal value             Lab Results   Component Value Date    TSH 2.840 12/04/2021    TSH 3.500 07/06/2020      Lab Results   Component Value Date    FREET4 1.26 12/04/2021    FREET4 0.9 07/06/2020      No results found for: DDIMERQUANT  Magnesium   Date Value Ref Range Status   07/06/2020 1.88 1.60 - 2.30 mg/dL Final      No results found for: DIGOXIN            Result Review :                           Assessment and Plan        Diagnoses and all orders for this visit:    1. Coronary artery disease involving native coronary artery of native heart without angina pectoris (Primary)  Assessment & Plan:  His coronary disease is stable.  He has not had any angina.  He will continue his aspirin Plavix and statin therapy.    Orders:  -     CBC (No Diff); Future  -     Comprehensive Metabolic Panel; Future  -     Lipid Panel; Future  -     TSH; Future  -     Lipid Panel; Future  -     Comprehensive Metabolic Panel; Future  -     Magnesium; Future    2. Essential hypertension  Assessment & Plan:  His hypertension is well controlled on losartan and carvedilol and spironolactone combination.  He  will continue the same.    Orders:  -     CBC (No Diff); Future  -     Comprehensive Metabolic Panel; Future  -     Lipid Panel; Future  -     TSH; Future  -     Lipid Panel; Future  -     Comprehensive Metabolic Panel; Future  -     Magnesium; Future    3. Hyperlipidemia LDL goal <70  Assessment & Plan:  He has not got his lipids checked.  He will get it done the next couple days and will decide if we need to adjust his lipid-lowering therapy    Orders:  -     CBC (No Diff); Future  -     Comprehensive Metabolic Panel; Future  -     Lipid Panel; Future  -     TSH; Future  -     Lipid Panel; Future  -     Comprehensive Metabolic Panel; Future  -     Magnesium; Future    4. PVD (peripheral vascular disease) with claudication (HCC)  Assessment & Plan:  His symptoms of peripheral vascular disease have recurred.  I want to do a CT angiogram of his abdomen and runoff of his iliac vessels.  However we do not know what is creatinine is for at least a year.  Therefore I am going to get his labs done this Friday and then decide on the CT angiogram.  His wife understands    Orders:  -     CBC (No Diff); Future  -     Comprehensive Metabolic Panel; Future  -     Lipid Panel; Future  -     TSH; Future  -     Lipid Panel; Future  -     Comprehensive Metabolic Panel; Future  -     Magnesium; Future    5. Cigarette nicotine dependence without complication  Assessment & Plan:  He has started smoking again and I have discussed with him the urgent need for him to stop smoking in light of his coronary disease and peripheral vascular disease.  He and his wife understand the long-term consequences of continued smoking as little as 2 cigarettes a day.  Counseling was performed for 4 to 6 minutes.  He is agreed to try to use nicotine patches and stop smoking in the next 1 month.      Other orders  -     nicotine (NICODERM CQ) 14 MG/24HR patch; Place 1 patch on the skin as directed by provider Daily.  Dispense: 30 each; Refill: 0  -      nicotine (NICODERM CQ) 7 MG/24HR patch; Place 1 patch on the skin as directed by provider Daily.  Dispense: 30 each; Refill: 1          Follow Up     Return in about 6 months (around 1/6/2023) for Dr Wing for fu.    Patient was given instructions and counseling regarding his condition or for health maintenance advice. Please see specific information pulled into the AVS if appropriate.     Taye Crowley MD  07/06/22 13:18 EDT

## 2022-07-06 NOTE — ASSESSMENT & PLAN NOTE
He has not got his lipids checked.  He will get it done the next couple days and will decide if we need to adjust his lipid-lowering therapy

## 2022-07-06 NOTE — ASSESSMENT & PLAN NOTE
His coronary disease is stable.  He has not had any angina.  He will continue his aspirin Plavix and statin therapy.

## 2022-07-06 NOTE — ASSESSMENT & PLAN NOTE
His hypertension is well controlled on losartan and carvedilol and spironolactone combination.  He will continue the same.

## 2022-07-06 NOTE — ASSESSMENT & PLAN NOTE
His symptoms of peripheral vascular disease have recurred.  I want to do a CT angiogram of his abdomen and runoff of his iliac vessels.  However we do not know what is creatinine is for at least a year.  Therefore I am going to get his labs done this Friday and then decide on the CT angiogram.  His wife understands

## 2022-12-22 DIAGNOSIS — I25.10 CORONARY ARTERY DISEASE INVOLVING NATIVE HEART WITHOUT ANGINA PECTORIS, UNSPECIFIED VESSEL OR LESION TYPE: ICD-10-CM

## 2022-12-22 RX ORDER — CLOPIDOGREL BISULFATE 75 MG/1
75 TABLET ORAL DAILY
Qty: 90 TABLET | Refills: 3 | OUTPATIENT
Start: 2022-12-22

## 2022-12-22 RX ORDER — SPIRONOLACTONE 25 MG/1
25 TABLET ORAL DAILY
Qty: 90 TABLET | Refills: 3 | OUTPATIENT
Start: 2022-12-22

## 2022-12-22 RX ORDER — CARVEDILOL 6.25 MG/1
TABLET ORAL
Qty: 270 TABLET | Refills: 3 | OUTPATIENT
Start: 2022-12-22

## 2022-12-28 DIAGNOSIS — I25.10 CORONARY ARTERY DISEASE INVOLVING NATIVE HEART WITHOUT ANGINA PECTORIS, UNSPECIFIED VESSEL OR LESION TYPE: ICD-10-CM

## 2022-12-28 RX ORDER — LOSARTAN POTASSIUM 50 MG/1
50 TABLET ORAL 2 TIMES DAILY
Qty: 180 TABLET | Refills: 3 | Status: SHIPPED | OUTPATIENT
Start: 2022-12-28

## 2022-12-28 RX ORDER — ATORVASTATIN CALCIUM 80 MG/1
80 TABLET, FILM COATED ORAL NIGHTLY
Qty: 90 TABLET | Refills: 3 | Status: SHIPPED | OUTPATIENT
Start: 2022-12-28

## 2022-12-28 RX ORDER — CARVEDILOL 6.25 MG/1
TABLET ORAL
Qty: 270 TABLET | Refills: 3 | Status: SHIPPED | OUTPATIENT
Start: 2022-12-28

## 2022-12-28 RX ORDER — NITROGLYCERIN 0.4 MG/1
0.4 TABLET SUBLINGUAL
Qty: 25 TABLET | Refills: 3 | Status: SHIPPED | OUTPATIENT
Start: 2022-12-28

## 2022-12-28 RX ORDER — CLOPIDOGREL BISULFATE 75 MG/1
75 TABLET ORAL DAILY
Qty: 90 TABLET | Refills: 3 | Status: SHIPPED | OUTPATIENT
Start: 2022-12-28

## 2022-12-28 RX ORDER — SPIRONOLACTONE 25 MG/1
25 TABLET ORAL DAILY
Qty: 90 TABLET | Refills: 3 | Status: SHIPPED | OUTPATIENT
Start: 2022-12-28

## 2023-04-18 RX ORDER — NITROGLYCERIN 0.4 MG/1
TABLET SUBLINGUAL
Qty: 25 TABLET | Refills: 3 | Status: SHIPPED | OUTPATIENT
Start: 2023-04-18

## 2023-12-27 DIAGNOSIS — I25.10 CORONARY ARTERY DISEASE INVOLVING NATIVE HEART WITHOUT ANGINA PECTORIS, UNSPECIFIED VESSEL OR LESION TYPE: ICD-10-CM

## 2023-12-28 RX ORDER — CLOPIDOGREL BISULFATE 75 MG/1
75 TABLET ORAL DAILY
Qty: 90 TABLET | Refills: 3 | OUTPATIENT
Start: 2023-12-28

## 2024-01-19 RX ORDER — CARVEDILOL 6.25 MG/1
TABLET ORAL
Qty: 270 TABLET | Refills: 3 | OUTPATIENT
Start: 2024-01-19

## 2024-01-19 RX ORDER — SPIRONOLACTONE 25 MG/1
25 TABLET ORAL DAILY
Qty: 90 TABLET | Refills: 3 | OUTPATIENT
Start: 2024-01-19

## 2024-04-24 RX ORDER — CARVEDILOL 6.25 MG/1
TABLET ORAL
Qty: 270 TABLET | Refills: 3 | OUTPATIENT
Start: 2024-04-24